# Patient Record
Sex: FEMALE | Race: WHITE | NOT HISPANIC OR LATINO | URBAN - METROPOLITAN AREA
[De-identification: names, ages, dates, MRNs, and addresses within clinical notes are randomized per-mention and may not be internally consistent; named-entity substitution may affect disease eponyms.]

---

## 2024-06-07 ENCOUNTER — LAB (OUTPATIENT)
Dept: LAB | Facility: LAB | Age: 59
End: 2024-06-07
Payer: COMMERCIAL

## 2024-06-07 ENCOUNTER — OFFICE VISIT (OUTPATIENT)
Dept: PRIMARY CARE | Facility: CLINIC | Age: 59
End: 2024-06-07
Payer: COMMERCIAL

## 2024-06-07 VITALS
SYSTOLIC BLOOD PRESSURE: 124 MMHG | HEIGHT: 62 IN | HEART RATE: 56 BPM | OXYGEN SATURATION: 100 % | RESPIRATION RATE: 18 BRPM | TEMPERATURE: 97.2 F | WEIGHT: 144.6 LBS | DIASTOLIC BLOOD PRESSURE: 70 MMHG | BODY MASS INDEX: 26.61 KG/M2

## 2024-06-07 DIAGNOSIS — Z12.11 SCREENING FOR COLON CANCER: ICD-10-CM

## 2024-06-07 DIAGNOSIS — D68.51 FACTOR V LEIDEN (MULTI): ICD-10-CM

## 2024-06-07 DIAGNOSIS — Z00.00 WELLNESS EXAMINATION: ICD-10-CM

## 2024-06-07 DIAGNOSIS — D68.51 FACTOR V LEIDEN (MULTI): Primary | ICD-10-CM

## 2024-06-07 DIAGNOSIS — Z00.00 WELLNESS EXAMINATION: Primary | ICD-10-CM

## 2024-06-07 LAB
ALBUMIN SERPL BCP-MCNC: 4 G/DL (ref 3.4–5)
ALP SERPL-CCNC: 57 U/L (ref 33–110)
ALT SERPL W P-5'-P-CCNC: 16 U/L (ref 7–45)
ANION GAP SERPL CALC-SCNC: 13 MMOL/L (ref 10–20)
APPEARANCE UR: CLEAR
AST SERPL W P-5'-P-CCNC: 18 U/L (ref 9–39)
BASOPHILS # BLD AUTO: 0.08 X10*3/UL (ref 0–0.1)
BASOPHILS NFR BLD AUTO: 1.7 %
BILIRUB SERPL-MCNC: 0.6 MG/DL (ref 0–1.2)
BILIRUB UR STRIP.AUTO-MCNC: NEGATIVE MG/DL
BUN SERPL-MCNC: 20 MG/DL (ref 6–23)
CALCIUM SERPL-MCNC: 9 MG/DL (ref 8.6–10.3)
CHLORIDE SERPL-SCNC: 105 MMOL/L (ref 98–107)
CHOLEST SERPL-MCNC: 282 MG/DL (ref 0–199)
CHOLESTEROL/HDL RATIO: 2.3
CO2 SERPL-SCNC: 28 MMOL/L (ref 21–32)
COLOR UR: ABNORMAL
CREAT SERPL-MCNC: 0.78 MG/DL (ref 0.5–1.05)
EGFRCR SERPLBLD CKD-EPI 2021: 88 ML/MIN/1.73M*2
EOSINOPHIL # BLD AUTO: 0.39 X10*3/UL (ref 0–0.7)
EOSINOPHIL NFR BLD AUTO: 8.4 %
ERYTHROCYTE [DISTWIDTH] IN BLOOD BY AUTOMATED COUNT: 13.2 % (ref 11.5–14.5)
GLUCOSE SERPL-MCNC: 82 MG/DL (ref 74–99)
GLUCOSE UR STRIP.AUTO-MCNC: NORMAL MG/DL
HCT VFR BLD AUTO: 39.2 % (ref 36–46)
HDLC SERPL-MCNC: 122.8 MG/DL
HGB BLD-MCNC: 13.4 G/DL (ref 12–16)
IMM GRANULOCYTES # BLD AUTO: 0.01 X10*3/UL (ref 0–0.7)
IMM GRANULOCYTES NFR BLD AUTO: 0.2 % (ref 0–0.9)
KETONES UR STRIP.AUTO-MCNC: NEGATIVE MG/DL
LDLC SERPL CALC-MCNC: 145 MG/DL
LEUKOCYTE ESTERASE UR QL STRIP.AUTO: ABNORMAL
LYMPHOCYTES # BLD AUTO: 1.77 X10*3/UL (ref 1.2–4.8)
LYMPHOCYTES NFR BLD AUTO: 38.1 %
MCH RBC QN AUTO: 33 PG (ref 26–34)
MCHC RBC AUTO-ENTMCNC: 34.2 G/DL (ref 32–36)
MCV RBC AUTO: 97 FL (ref 80–100)
MONOCYTES # BLD AUTO: 0.36 X10*3/UL (ref 0.1–1)
MONOCYTES NFR BLD AUTO: 7.8 %
MUCOUS THREADS #/AREA URNS AUTO: NORMAL /LPF
NEUTROPHILS # BLD AUTO: 2.03 X10*3/UL (ref 1.2–7.7)
NEUTROPHILS NFR BLD AUTO: 43.8 %
NITRITE UR QL STRIP.AUTO: NEGATIVE
NON HDL CHOLESTEROL: 159 MG/DL (ref 0–149)
NRBC BLD-RTO: 0 /100 WBCS (ref 0–0)
PH UR STRIP.AUTO: 5.5 [PH]
PLATELET # BLD AUTO: 218 X10*3/UL (ref 150–450)
POTASSIUM SERPL-SCNC: 4.6 MMOL/L (ref 3.5–5.3)
PROT SERPL-MCNC: 6.9 G/DL (ref 6.4–8.2)
PROT UR STRIP.AUTO-MCNC: NEGATIVE MG/DL
RBC # BLD AUTO: 4.06 X10*6/UL (ref 4–5.2)
RBC # UR STRIP.AUTO: NEGATIVE /UL
RBC #/AREA URNS AUTO: NORMAL /HPF
SODIUM SERPL-SCNC: 141 MMOL/L (ref 136–145)
SP GR UR STRIP.AUTO: 1.02
SQUAMOUS #/AREA URNS AUTO: NORMAL /HPF
TRIGL SERPL-MCNC: 73 MG/DL (ref 0–149)
TSH SERPL-ACNC: 3.3 MIU/L (ref 0.44–3.98)
UROBILINOGEN UR STRIP.AUTO-MCNC: NORMAL MG/DL
VLDL: 15 MG/DL (ref 0–40)
WBC # BLD AUTO: 4.6 X10*3/UL (ref 4.4–11.3)
WBC #/AREA URNS AUTO: NORMAL /HPF

## 2024-06-07 PROCEDURE — 83036 HEMOGLOBIN GLYCOSYLATED A1C: CPT

## 2024-06-07 PROCEDURE — 99203 OFFICE O/P NEW LOW 30 MIN: CPT | Performed by: INTERNAL MEDICINE

## 2024-06-07 PROCEDURE — 36415 COLL VENOUS BLD VENIPUNCTURE: CPT

## 2024-06-07 PROCEDURE — 80053 COMPREHEN METABOLIC PANEL: CPT

## 2024-06-07 PROCEDURE — 81001 URINALYSIS AUTO W/SCOPE: CPT

## 2024-06-07 PROCEDURE — 80061 LIPID PANEL: CPT

## 2024-06-07 PROCEDURE — 99386 PREV VISIT NEW AGE 40-64: CPT | Performed by: INTERNAL MEDICINE

## 2024-06-07 PROCEDURE — 1036F TOBACCO NON-USER: CPT | Performed by: INTERNAL MEDICINE

## 2024-06-07 PROCEDURE — 84443 ASSAY THYROID STIM HORMONE: CPT

## 2024-06-07 PROCEDURE — 85025 COMPLETE CBC W/AUTO DIFF WBC: CPT

## 2024-06-07 PROCEDURE — 81241 F5 GENE: CPT

## 2024-06-07 ASSESSMENT — ENCOUNTER SYMPTOMS
DYSURIA: 0
ALLERGIC/IMMUNOLOGIC NEGATIVE: 1
FREQUENCY: 0
WEAKNESS: 0
DIZZINESS: 0
ABDOMINAL PAIN: 0
HEADACHES: 0
BACK PAIN: 0
BLOOD IN STOOL: 0
FEVER: 0
CONSTIPATION: 0
FATIGUE: 0
PALPITATIONS: 0
ADENOPATHY: 0
ARTHRALGIAS: 0
NUMBNESS: 0
SHORTNESS OF BREATH: 0
WHEEZING: 0
ACTIVITY CHANGE: 0
JOINT SWELLING: 0
ENDOCRINE NEGATIVE: 1
COUGH: 0
AGITATION: 0
EYE REDNESS: 0

## 2024-06-07 NOTE — PROGRESS NOTES
"Subjective   Patient ID: Ada Lott is a 58 y.o. female who presents for Establish Care.    She is here to get established. She works at [employer].    PMH : Factor V Leyden def , DVT right leg , 2018    PSH : right patella repair    Allergy : NKA    FH :  Father -  due to aortic aneurysm rupture, at 59  Mother - no issues , senility  Sister -  due to myeloid leukemia  Cousin - Factor V leyden def    PH :  never a smoker , alcohol - socially, No drug use  Works at [employer] , plays golf , and exercises               Review of Systems   Constitutional:  Negative for activity change, fatigue and fever.   HENT:  Negative for congestion.    Eyes:  Negative for redness and visual disturbance.   Respiratory:  Negative for cough, shortness of breath and wheezing.    Cardiovascular:  Negative for chest pain, palpitations and leg swelling.   Gastrointestinal:  Negative for abdominal pain, blood in stool and constipation.   Endocrine: Negative.    Genitourinary:  Negative for dysuria, frequency and urgency.   Musculoskeletal:  Negative for arthralgias, back pain and joint swelling.   Skin:  Negative for rash.   Allergic/Immunologic: Negative.    Neurological:  Negative for dizziness, weakness, numbness and headaches.   Hematological:  Negative for adenopathy.   Psychiatric/Behavioral:  Negative for agitation and behavioral problems.        Objective   /70 (BP Location: Left arm, Patient Position: Sitting, BP Cuff Size: Adult)   Pulse 56   Temp 36.2 °C (97.2 °F) (Temporal)   Resp 18   Ht 1.575 m (5' 2\")   Wt 65.6 kg (144 lb 9.6 oz)   SpO2 100%   BMI 26.45 kg/m²     Physical Exam  Constitutional:       Appearance: Normal appearance.   HENT:      Head: Normocephalic and atraumatic.      Right Ear: Tympanic membrane and external ear normal.      Left Ear: Tympanic membrane and external ear normal.      Nose: No congestion.      Mouth/Throat:      Mouth: Mucous membranes are moist.      " Pharynx: Oropharynx is clear.   Eyes:      Extraocular Movements: Extraocular movements intact.      Conjunctiva/sclera: Conjunctivae normal.      Pupils: Pupils are equal, round, and reactive to light.   Cardiovascular:      Rate and Rhythm: Normal rate and regular rhythm.      Pulses: Normal pulses.      Heart sounds: Normal heart sounds. No murmur heard.  Pulmonary:      Effort: Pulmonary effort is normal.      Breath sounds: Normal breath sounds. No wheezing or rales.   Abdominal:      General: Abdomen is flat. Bowel sounds are normal.      Palpations: Abdomen is soft.      Hernia: No hernia is present.   Musculoskeletal:         General: No swelling or tenderness. Normal range of motion.      Cervical back: Normal range of motion and neck supple.      Right lower leg: No edema.      Left lower leg: No edema.   Skin:     General: Skin is warm and dry.      Capillary Refill: Capillary refill takes less than 2 seconds.      Findings: No rash.   Neurological:      General: No focal deficit present.      Mental Status: She is alert and oriented to person, place, and time.   Psychiatric:         Mood and Affect: Mood normal.         Behavior: Behavior normal.         Assessment/Plan        H/o right leg DVT :   Eliquis po bid    Factor V Leyden :   High risk for clotting, DVT  Refer to Dr. Antoinette Doan [facility]    Vision check  Flushot in Fall  Covid booster , Shingrix at pharmacy  Check labs  Gyn check , and pelvic / pap - Dr Betina Norton  Colonoscopy - refer to Dr Herrera [facility]

## 2024-06-08 LAB
EST. AVERAGE GLUCOSE BLD GHB EST-MCNC: 128 MG/DL
HBA1C MFR BLD: 6.1 %

## 2024-06-09 DIAGNOSIS — E78.00 PURE HYPERCHOLESTEROLEMIA: Primary | ICD-10-CM

## 2024-06-09 RX ORDER — ATORVASTATIN CALCIUM 20 MG/1
20 TABLET, FILM COATED ORAL DAILY
Qty: 100 TABLET | Refills: 3 | Status: SHIPPED | OUTPATIENT
Start: 2024-06-09 | End: 2025-07-14

## 2024-06-09 NOTE — RESULT ENCOUNTER NOTE
High lipids, and A1C is 6.1  Atorvastatin 20 mf daily, eRx sent  Low saturated fat diet  Watch diet for carbs and sweets

## 2024-06-10 NOTE — TELEPHONE ENCOUNTER
Ok, we can repeat lipid panel in six months. Continue low fat diet   Hematology consult , Dr Antoinette Doan at [facility]

## 2024-06-10 NOTE — TELEPHONE ENCOUNTER
----- Message from Pamela Allen MD sent at 6/9/2024 12:24 PM EDT -----  High lipids, and A1C is 6.1  Atorvastatin 20 mf daily, eRx sent  Low saturated fat diet  Watch diet for carbs and sweets

## 2024-06-14 LAB
ELECTRONICALLY SIGNED BY: ABNORMAL
FACTOR V LEIDEN INTERPRETATION: ABNORMAL
FACTOR V LEIDEN RESULT: ABNORMAL

## 2024-06-17 NOTE — RESULT ENCOUNTER NOTE
Factor V Leiden is heterozygous  She should see Hematology, Dr Antoinette Doan at [facility], consult was ordered

## 2024-07-22 ENCOUNTER — TELEPHONE (OUTPATIENT)
Dept: PRIMARY CARE | Facility: CLINIC | Age: 59
End: 2024-07-22
Payer: COMMERCIAL

## 2024-07-22 NOTE — TELEPHONE ENCOUNTER
Your statin is quite high, and if you do not take statin,, your CV risk will remain high , FYI  Low saturated fat diet , and exercise , and may repeat lipid panel in 4 to six months

## 2025-01-14 DIAGNOSIS — D68.51 FACTOR V LEIDEN (MULTI): ICD-10-CM

## 2025-01-14 NOTE — TELEPHONE ENCOUNTER
Last seen on 6/07/24, Pl. Schedule follow up OV  She should have seen Hematology , Dr Antoinette Doan , was  referred  Med refilled

## 2025-05-15 DIAGNOSIS — Z00.00 WELLNESS EXAMINATION: Primary | ICD-10-CM

## 2025-05-15 DIAGNOSIS — R73.9 HYPERGLYCEMIA: ICD-10-CM

## 2025-05-15 DIAGNOSIS — E78.00 PURE HYPERCHOLESTEROLEMIA: ICD-10-CM

## 2025-05-16 ENCOUNTER — OFFICE VISIT (OUTPATIENT)
Dept: PRIMARY CARE | Facility: CLINIC | Age: 60
End: 2025-05-16
Payer: COMMERCIAL

## 2025-05-16 VITALS
HEART RATE: 70 BPM | SYSTOLIC BLOOD PRESSURE: 118 MMHG | BODY MASS INDEX: 27.57 KG/M2 | TEMPERATURE: 96.3 F | WEIGHT: 149.8 LBS | OXYGEN SATURATION: 99 % | HEIGHT: 62 IN | RESPIRATION RATE: 18 BRPM | DIASTOLIC BLOOD PRESSURE: 68 MMHG

## 2025-05-16 DIAGNOSIS — Z12.11 SCREENING FOR COLON CANCER: ICD-10-CM

## 2025-05-16 DIAGNOSIS — D68.51 FACTOR V LEIDEN (MULTI): ICD-10-CM

## 2025-05-16 DIAGNOSIS — Z00.00 WELLNESS EXAMINATION: Primary | ICD-10-CM

## 2025-05-16 DIAGNOSIS — E78.00 PURE HYPERCHOLESTEROLEMIA: ICD-10-CM

## 2025-05-16 DIAGNOSIS — Z86.718 HISTORY OF DVT (DEEP VEIN THROMBOSIS): ICD-10-CM

## 2025-05-16 PROCEDURE — 3008F BODY MASS INDEX DOCD: CPT | Performed by: INTERNAL MEDICINE

## 2025-05-16 PROCEDURE — 99214 OFFICE O/P EST MOD 30 MIN: CPT | Performed by: INTERNAL MEDICINE

## 2025-05-16 PROCEDURE — 99396 PREV VISIT EST AGE 40-64: CPT | Performed by: INTERNAL MEDICINE

## 2025-05-16 PROCEDURE — 1036F TOBACCO NON-USER: CPT | Performed by: INTERNAL MEDICINE

## 2025-05-16 RX ORDER — ATORVASTATIN CALCIUM 20 MG/1
20 TABLET, FILM COATED ORAL DAILY
Qty: 100 TABLET | Refills: 3 | Status: SHIPPED | OUTPATIENT
Start: 2025-05-16 | End: 2026-06-20

## 2025-05-16 ASSESSMENT — ENCOUNTER SYMPTOMS
ACTIVITY CHANGE: 0
ENDOCRINE NEGATIVE: 1
HEADACHES: 0
PALPITATIONS: 0
BACK PAIN: 0
DIZZINESS: 0
ABDOMINAL DISTENTION: 0
SHORTNESS OF BREATH: 0
NERVOUS/ANXIOUS: 0
WHEEZING: 0
ALLERGIC/IMMUNOLOGIC NEGATIVE: 1
DYSURIA: 0
CONSTIPATION: 0
EYE REDNESS: 0
FREQUENCY: 0
ADENOPATHY: 0
AGITATION: 0
BLOOD IN STOOL: 0

## 2025-05-16 ASSESSMENT — PATIENT HEALTH QUESTIONNAIRE - PHQ9
1. LITTLE INTEREST OR PLEASURE IN DOING THINGS: NOT AT ALL
2. FEELING DOWN, DEPRESSED OR HOPELESS: NOT AT ALL
SUM OF ALL RESPONSES TO PHQ9 QUESTIONS 1 AND 2: 0

## 2025-05-16 NOTE — PROGRESS NOTES
"Subjective   Patient ID: Ada Lott is a 59 y.o. female who presents for Annual Exam and Med Refill.    She is here  for annual wellness, and check labs.     PMH : Factor V Leyden def , DVT left leg , 2018    PSH : right patella repair    Allergy : NKA    FH :  Father -  due to aortic aneurysm rupture, at 59  Mother - no issues , senility  Sister -  due to myeloid leukemia  Cousin - Factor V leyden def    PH :  never a smoker , alcohol - socially, No drug use  Worked at [employer] , she plays golf , and exercises                Review of Systems   Constitutional:  Negative for activity change.   HENT:  Negative for congestion.    Eyes:  Negative for redness and visual disturbance.   Respiratory:  Negative for shortness of breath and wheezing.    Cardiovascular:  Negative for chest pain, palpitations and leg swelling.   Gastrointestinal:  Negative for abdominal distention, blood in stool and constipation.   Endocrine: Negative.    Genitourinary:  Negative for dysuria, frequency and urgency.   Musculoskeletal:  Negative for back pain.   Allergic/Immunologic: Negative.    Neurological:  Negative for dizziness and headaches.   Hematological:  Negative for adenopathy.   Psychiatric/Behavioral:  Negative for agitation and behavioral problems. The patient is not nervous/anxious.        Objective   /68 (BP Location: Left arm, Patient Position: Sitting, BP Cuff Size: Adult)   Pulse 70   Temp 35.7 °C (96.3 °F) (Temporal)   Resp 18   Ht 1.575 m (5' 2\")   Wt 67.9 kg (149 lb 12.8 oz)   SpO2 99%   BMI 27.40 kg/m²     Physical Exam  Constitutional:       Appearance: Normal appearance.   HENT:      Head: Normocephalic and atraumatic.      Right Ear: Tympanic membrane and external ear normal.      Left Ear: Tympanic membrane and external ear normal.      Nose: No congestion.      Mouth/Throat:      Mouth: Mucous membranes are moist.      Pharynx: Oropharynx is clear.   Eyes:      Extraocular " Movements: Extraocular movements intact.      Conjunctiva/sclera: Conjunctivae normal.      Pupils: Pupils are equal, round, and reactive to light.   Neck:      Vascular: No carotid bruit.   Cardiovascular:      Rate and Rhythm: Normal rate and regular rhythm.      Pulses: Normal pulses.      Heart sounds: Normal heart sounds. No murmur heard.  Pulmonary:      Effort: Pulmonary effort is normal.      Breath sounds: Normal breath sounds. No wheezing or rales.   Abdominal:      General: Abdomen is flat. Bowel sounds are normal.      Palpations: Abdomen is soft.      Hernia: No hernia is present.   Musculoskeletal:         General: No swelling or tenderness. Normal range of motion.      Cervical back: Normal range of motion and neck supple.      Right lower leg: No edema.      Left lower leg: No edema.   Skin:     General: Skin is warm and dry.      Capillary Refill: Capillary refill takes less than 2 seconds.      Findings: No rash.   Neurological:      General: No focal deficit present.      Mental Status: She is alert and oriented to person, place, and time.      Motor: No weakness.      Gait: Gait normal.   Psychiatric:         Mood and Affect: Mood normal.         Behavior: Behavior normal.         Assessment/Plan        H/o  left leg DVT : 12/2017  Eliquis 2.5 mg  po bid    Factor V Leyden mutation, Heterozygous.    High risk for clotting, DVT  She has seen Dr Cosme Sanz before, Hematology  She wants to see Hematology in  [place]      HPL :   Statin  Low saturated fat diet    Hyperglycemia :  Reduce carbs , sweets  Follow A1C    Vision check  Flushot in Fall  Covid booster , Shingrix at pharmacy, Prevnar 20  Check labs  Gyn check , and pelvic / pap - Dr Betina Norton  Colonoscopy - refer to [facility] Encarnacion ( she seems reluctant )  Cologuard

## 2025-05-22 LAB
ALBUMIN SERPL-MCNC: 4.2 G/DL (ref 3.6–5.1)
ALP SERPL-CCNC: 58 U/L (ref 37–153)
ALT SERPL-CCNC: 16 U/L (ref 6–29)
ANION GAP SERPL CALCULATED.4IONS-SCNC: 7 MMOL/L (CALC) (ref 7–17)
APPEARANCE UR: CLEAR
AST SERPL-CCNC: 21 U/L (ref 10–35)
BACTERIA #/AREA URNS HPF: ABNORMAL /HPF
BASOPHILS # BLD AUTO: 80 CELLS/UL (ref 0–200)
BASOPHILS NFR BLD AUTO: 1.7 %
BILIRUB SERPL-MCNC: 0.6 MG/DL (ref 0.2–1.2)
BILIRUB UR QL STRIP: NEGATIVE
BUN SERPL-MCNC: 14 MG/DL (ref 7–25)
CALCIUM SERPL-MCNC: 9.5 MG/DL (ref 8.6–10.4)
CHLORIDE SERPL-SCNC: 103 MMOL/L (ref 98–110)
CHOLEST SERPL-MCNC: 292 MG/DL
CHOLEST/HDLC SERPL: 2.5 (CALC)
CO2 SERPL-SCNC: 28 MMOL/L (ref 20–32)
COLOR UR: YELLOW
CREAT SERPL-MCNC: 0.8 MG/DL (ref 0.5–1.05)
EGFRCR SERPLBLD CKD-EPI 2021: 84 ML/MIN/1.73M2
EOSINOPHIL # BLD AUTO: 277 CELLS/UL (ref 15–500)
EOSINOPHIL NFR BLD AUTO: 5.9 %
ERYTHROCYTE [DISTWIDTH] IN BLOOD BY AUTOMATED COUNT: 12.1 % (ref 11–15)
EST. AVERAGE GLUCOSE BLD GHB EST-MCNC: 123 MG/DL
EST. AVERAGE GLUCOSE BLD GHB EST-SCNC: 6.8 MMOL/L
GLUCOSE SERPL-MCNC: 92 MG/DL (ref 65–99)
GLUCOSE UR QL STRIP: NEGATIVE
HBA1C MFR BLD: 5.9 %
HCT VFR BLD AUTO: 39.9 % (ref 35–45)
HDLC SERPL-MCNC: 116 MG/DL
HGB BLD-MCNC: 13.1 G/DL (ref 11.7–15.5)
HGB UR QL STRIP: NEGATIVE
HYALINE CASTS #/AREA URNS LPF: ABNORMAL /LPF
KETONES UR QL STRIP: ABNORMAL
LDLC SERPL CALC-MCNC: 160 MG/DL (CALC)
LEUKOCYTE ESTERASE UR QL STRIP: ABNORMAL
LYMPHOCYTES # BLD AUTO: 2176 CELLS/UL (ref 850–3900)
LYMPHOCYTES NFR BLD AUTO: 46.3 %
MCH RBC QN AUTO: 31.9 PG (ref 27–33)
MCHC RBC AUTO-ENTMCNC: 32.8 G/DL (ref 32–36)
MCV RBC AUTO: 97.1 FL (ref 80–100)
MONOCYTES # BLD AUTO: 310 CELLS/UL (ref 200–950)
MONOCYTES NFR BLD AUTO: 6.6 %
NEUTROPHILS # BLD AUTO: 1857 CELLS/UL (ref 1500–7800)
NEUTROPHILS NFR BLD AUTO: 39.5 %
NITRITE UR QL STRIP: NEGATIVE
NONHDLC SERPL-MCNC: 176 MG/DL (CALC)
PH UR STRIP: 6.5 [PH] (ref 5–8)
PLATELET # BLD AUTO: 309 THOUSAND/UL (ref 140–400)
PMV BLD REES-ECKER: 11.1 FL (ref 7.5–12.5)
POTASSIUM SERPL-SCNC: 4.3 MMOL/L (ref 3.5–5.3)
PROT SERPL-MCNC: 7.1 G/DL (ref 6.1–8.1)
PROT UR QL STRIP: NEGATIVE
RBC # BLD AUTO: 4.11 MILLION/UL (ref 3.8–5.1)
RBC #/AREA URNS HPF: ABNORMAL /HPF
SERVICE CMNT-IMP: ABNORMAL
SODIUM SERPL-SCNC: 138 MMOL/L (ref 135–146)
SP GR UR STRIP: 1.02 (ref 1–1.03)
SQUAMOUS #/AREA URNS HPF: ABNORMAL /HPF
TRIGL SERPL-MCNC: 69 MG/DL
WBC # BLD AUTO: 4.7 THOUSAND/UL (ref 3.8–10.8)
WBC #/AREA URNS HPF: ABNORMAL /HPF

## 2025-05-23 DIAGNOSIS — E78.00 PURE HYPERCHOLESTEROLEMIA: ICD-10-CM

## 2025-05-23 RX ORDER — ATORVASTATIN CALCIUM 80 MG/1
80 TABLET, FILM COATED ORAL DAILY
Qty: 90 TABLET | Refills: 3 | Status: SHIPPED | OUTPATIENT
Start: 2025-05-23

## 2025-05-23 NOTE — RESULT ENCOUNTER NOTE
are too high lipids  Other labs are ok  Increase atorvastatin to 80 mg daily, strict low fat diet   Script sent

## 2025-06-02 ENCOUNTER — HOSPITAL ENCOUNTER (OUTPATIENT)
Dept: RADIOLOGY | Facility: CLINIC | Age: 60
Discharge: HOME | End: 2025-06-02
Payer: COMMERCIAL

## 2025-06-02 ENCOUNTER — OFFICE VISIT (OUTPATIENT)
Dept: ORTHOPEDIC SURGERY | Facility: CLINIC | Age: 60
End: 2025-06-02
Payer: COMMERCIAL

## 2025-06-02 DIAGNOSIS — M25.551 PAIN OF RIGHT HIP: ICD-10-CM

## 2025-06-02 DIAGNOSIS — M25.561 ACUTE PAIN OF RIGHT KNEE: ICD-10-CM

## 2025-06-02 DIAGNOSIS — M25.561 ACUTE PAIN OF RIGHT KNEE: Primary | ICD-10-CM

## 2025-06-02 PROCEDURE — 73560 X-RAY EXAM OF KNEE 1 OR 2: CPT | Mod: RIGHT SIDE | Performed by: RADIOLOGY

## 2025-06-02 PROCEDURE — 73560 X-RAY EXAM OF KNEE 1 OR 2: CPT | Mod: RT

## 2025-06-02 PROCEDURE — 73502 X-RAY EXAM HIP UNI 2-3 VIEWS: CPT | Mod: RT

## 2025-06-02 PROCEDURE — 99214 OFFICE O/P EST MOD 30 MIN: CPT

## 2025-06-03 ENCOUNTER — TELEPHONE (OUTPATIENT)
Dept: PRIMARY CARE | Facility: CLINIC | Age: 60
End: 2025-06-03
Payer: COMMERCIAL

## 2025-06-03 LAB — NONINV COLON CA DNA+OCC BLD SCRN STL QL: NEGATIVE

## 2025-06-03 NOTE — TELEPHONE ENCOUNTER
----- Message from Pamela Allen sent at 6/2/2025  3:53 PM EDT -----  Degenerative changes in hip joints and spine  ----- Message -----  From: Interface, Radiology Results In  Sent: 6/2/2025   3:22 PM EDT  To: Pamela Allen MD

## 2025-06-05 NOTE — PROGRESS NOTES
History of Present Illness  Ada Lott is a 59 y.o.s female presenting for evaluation of side: right hip and knee pain for the past 1 month. Sx started out of nowhere and localized to right lateral pain that shoots down from the hip to the knee. Described as sharp, shooting pain. It does not go into the toes. Since increased with golfing. Improved with tylenol and rest. has not had similar sx in the past. has not tried therapy for the pain.  Denies trauma/fall. Denies numbness, tingling, f/c, n/v, CP, SOB, or any other complaints/concerns.      Medical History[1]    Medication Documentation Review Audit       Reviewed by Yeni Hidalgo LPN (Licensed Nurse) on 06/02/25 at 0846      Medication Order Taking? Sig Documenting Provider Last Dose Status   apixaban (Eliquis) 2.5 mg tablet [id number] Yes Take 1 tablet (2.5 mg) by mouth 2 times a day. Pamela Allen MD  Active   atorvastatin (Lipitor) 80 mg tablet [id number] Yes Take 1 tablet (80 mg) by mouth once daily. Pamela Allen MD  Active                    RX Allergies[2]    Social History     Socioeconomic History    Marital status:      Spouse name: Not on file    Number of children: Not on file    Years of education: Not on file    Highest education level: Not on file   Occupational History    Not on file   Tobacco Use    Smoking status: Never    Smokeless tobacco: Never   Substance and Sexual Activity    Alcohol use: Yes    Drug use: Never    Sexual activity: Not on file   Other Topics Concern    Not on file   Social History Narrative    Not on file     Social Drivers of Health     Financial Resource Strain: Not on file   Food Insecurity: Not on file   Transportation Needs: Not on file   Physical Activity: Not on file   Stress: Not on file   Social Connections: Not on file   Intimate Partner Violence: Not on file   Housing Stability: Not on file       Surgical History[3]     Review of Systems   30 point ROS reviewed and negative other than as  listed in the HPI.      Exam  Gen: The pt is A&Ox3, NAD, and appear state age and weight  Psychiatric: mood and affect are appropriate   Eyes: sclera are white, EOM grossly intact  ENT: MMM  Neck: supple, thyroid is midline  Respiratory: respirations are nonlabored, chest rise symmetric  CV: rate is regular by palpation of distal pulses  Abdomen: nondistended   Integument: no obvious cutaneous lesions noted. No signs of lymphangitis. No signs of systemic edema.   MSK:  Gait and stance examination demonstrate a reciprocal heel toe gait. Trendelenburg sign is negative.    right hip examination reveals 120 degrees of flexion, 40 degrees of internal rotation, 60 degrees of external rotation, and 40 degrees of abduction. Anterior impingement sign is negative. Posterior impingement sign is negative. Subspine impingement sign is negative. ELZA test is negative. There is no tenderness to palpation over the anterior groin. There is tenderness to palpation over the pubic symphysis, greater trochanter, or gluteal region. Posterior apprehension is negative. Anterior apprehension is negative. Hip flexion strength is 5 out of 5. Adductor strength is 5 out of 5. Abduction strength is 5 out of 5 in the lateral position. Pelvic obliquity is level.    Distally, ankle dorsiflexion and plantarflexion as well as great toe extension is 5 out of 5. Sensation is intact to light touch in the tibial, sural, saphenous, superficial peroneal, and deep peroneal nerve distributions. The foot is warm and well-perfused with palpable pedal pulses. There is no obvious edema present. Skin is warm, dry and intact.     There is no tenderness at the right knee and has full ROM of the knee.     Imaging:  I personally reviewed multiple views of the right hip, right knee were obtained in the office today demonstrate right hip osteoarthritis and lower lumbar osteoarthritis and right knee patellofemoral arthritis with stable patella hardware.       Assessment:  right hip, knee, and lower lumbar arthritis    Plan:  Patient presents with right hip and knee pain for 1 month. Patient states that the pain is sharp and shooting and does not go down into the toes. She has tried tylenol with some improvement. On exam, there was tenderness along the lateral thigh. Imaging reveals osteoarthritis of the right hip, knee, and lower lumbar. I discussed that this pain could be due to her hip arthritis or it could be due to the nerve being pinched at her lower back arthritis. I discussed treating this conservatively with oral anti-inflammatories, PT, and ultrasound guided hip injection. Patient at this point would just like to try tylenol and PT. A referral was given for PT to work on hip, knee, and lower back ROM, quad strengthening, gait training, and weight bearing. I also discussed that if it does not improve we can start her on gabapentin to see if it helps and can confirm it is nerve pain.     Follow up in 3 months    Natural history reviewed. All of the pt questions/concerns addressed and they are in agreement with the plan.         [1]   Past Medical History:  Diagnosis Date    Abdominal distension (gaseous) 07/10/2018    Abdominal bloating    Acute embolism and thrombosis of unspecified deep veins of unspecified lower extremity 07/10/2018    DVT (deep venous thrombosis)    Decreased white blood cell count, unspecified 07/12/2018    Leukopenia    Encounter for screening for malignant neoplasm of cervix 09/27/2018    Pap smear for cervical cancer screening    Encounter for screening for malignant neoplasm of colon 07/10/2018    Screen for colon cancer    Encounter for screening mammogram for malignant neoplasm of breast 07/10/2018    Screening mammogram, encounter for    Pain in right leg 02/03/2021    Right leg pain   [2]   Allergies  Allergen Reactions    Tramadol Other   [3] No past surgical history on file.

## 2025-06-10 ENCOUNTER — EVALUATION (OUTPATIENT)
Dept: PHYSICAL THERAPY | Facility: CLINIC | Age: 60
End: 2025-06-10
Payer: COMMERCIAL

## 2025-06-10 DIAGNOSIS — M16.11 ARTHRITIS OF RIGHT HIP: Primary | ICD-10-CM

## 2025-06-10 PROCEDURE — 97161 PT EVAL LOW COMPLEX 20 MIN: CPT | Mod: GP | Performed by: PHYSICAL THERAPIST

## 2025-06-10 PROCEDURE — 97110 THERAPEUTIC EXERCISES: CPT | Mod: GP | Performed by: PHYSICAL THERAPIST

## 2025-06-10 ASSESSMENT — PATIENT HEALTH QUESTIONNAIRE - PHQ9
SUM OF ALL RESPONSES TO PHQ9 QUESTIONS 1 AND 2: 0
2. FEELING DOWN, DEPRESSED OR HOPELESS: NOT AT ALL
1. LITTLE INTEREST OR PLEASURE IN DOING THINGS: NOT AT ALL

## 2025-06-10 ASSESSMENT — ENCOUNTER SYMPTOMS
LOSS OF SENSATION IN FEET: 0
OCCASIONAL FEELINGS OF UNSTEADINESS: 0
DEPRESSION: 0

## 2025-06-10 NOTE — PROGRESS NOTES
Physical Therapy Evaluation    Patient Name: Ada Lott  MRN: [id number]  Today's Date: 6/10/2025  Visit: 1/auth  DOS/DOI:  2/1/25  End date:  per auth  Referred by:   Mirta Samuels  Time Calculation  Start Time: 1224  Stop Time: 1303  Time Calculation (min): 39 min  PT Evaluation Time Entry  PT Evaluation (Low) Time Entry: 27  PT Therapeutic Procedures Time Entry  Therapeutic Exercise Time Entry: 12                 Diagnosis:   1. Arthritis of right hip  Follow Up In Physical Therapy                PMH  - she has factor 5 blood clotting disorder.  - hx of lumbar disc bulge in 2018    HPI  She dev anterior R hip pain a few months ago without injury.   She tried doing her own stretches.  It continued to bother her and she saw her MD.  X-rays were done which were positive for mild to mod OA.  She was referred to PT.    Precautions  - facto 5 blood clotting disorder.    SUBJECTIVE  Symptoms:  - she report lateral and anterior R hip pain rated 2-7/10.  It is described as burning that becomes sharp.  It increases when sitting reclines, stepping up with the R LE and twisting.  It decreases with ice and laying supine.  Functionally she is limited in golfing, Walking, stairs.    Patient's Living Environment:  - her mom lives with her and she cares for her.  Her partner lives next door  - 1 floor home ([facility]).  1 step out of house.    Previous Level of Function:  - she was able to do ADLs prior to 2/1/25    Patient's Therapy Goals:  - resolve pain    OBJECTIVE  Observation:    - amb with normal gait    Palpation:   - no pain at R great troch    AROM:  - Lumbar flex = Wnl, repeated flex = WFL, ext = WFL, SB = 50% B, rot = WFL B with R groin pain during R rot.  - R hip flex = 110, abd = 15 with tightness, ER = 12 with pain, IR = 35 with pain    Myotomes:  - L2-S2 = 5/5 B    MMT:  - R hip flex = 4+/5, ext = 5/5, add- 5/5, abd = 4+/5, ER/IR = 4/5    Special Tests:  - Negative slump, B SLR, compression over load  -  Positive R hip scour, FADDIR and ELZA  - Negative R hip janice    Joint Mobility:   - decreased femoral mobility into add and IR    Outcome Measure:  - LEFS = 65    ASSESSMENT  The patient is a 60 y/o female presenting to PT with R hip pain due to mild to mod OA.  Will also continue to monitor for labral issues.  She has decreased AROM and weakness into IR/ER and has positive special tests for OA and labral involvement.  As a result she has difficulty with stairs, walking and playing golf.  She will benefit from PT to address these deficits for return to ADLS.    Is skilled care required:  yes  Rehab potential:  good  Clinical presentation:  Stable and/or uncomplicated characteristics  Complexity:   . Low complexity due to patient's clinical presentation being stable and uncomplicated by any significant comorbidities that may affect rehab tolerance and progression.       Personal factors effecting care:  none    PLAN  Frequency/duration:  2x/wk x 6 wks  Planned Interventions:  MT, therapeutic exercise, neuro re-ed, modalities prn  Patient/caregiver agrees with POC:  yes    TODAY'S TREATMENT  - evaluation of the R hip completed.  She was educated on her dx and the anatomy of the hip.  - she was given a HEP as seen below:    Access Code: [email or web address]  URL: [email or web address]  Date: 06/10/2025  Prepared by: Deanna Major    Exercises  - Prone Hip Internal and External Rotation AROM  - 1 x daily - 7 x weekly - 2-3 sets - 10 reps  - Prone Hip Extension  - 1 x daily - 7 x weekly - 2-3 sets - 10 reps  - Hip Flexor Stretch at Edge of Bed  - 1 x daily - 7 x weekly - 3 sets - 30 min hold  - Sidelying Hip Abduction  - 1 x daily - 7 x weekly - 2-3 sets - 10 reps    Goals:   Active       PT Problem       PT Goal 1:  she will be independent with her HEP       Start:  06/10/25    Expected End:  06/17/25            PT Goal 2:  increase hip IR/ER AROM by 10-15 degrees       Start:  06/10/25    Expected End:   25            PT Goal 3:  increase strength of IR/ER mm by half grade for stair amb       Start:  06/10/25    Expected End:  25            PT Goal 4:  amb unlimited distances without increase R hip pain for community amb       Start:  06/10/25    Expected End:  25            PT Goal 5:  return to playing golf without increase R hip pain       Start:  06/10/25    Expected End:  25              Insurance Authorization Information  Date of Evaluation: 6/10/25    Onset Date: 2025    Referring Physician: Mirta Samson     Surgery in the Last 3 months:  no    CPT Codes: Therapeutic Exercise: 07770 Therapeutic Activity: 63569 Neuromuscular Re-Education: 35745 Manual Therapy: 24064 Gait Trainin Self-Care/Home Management Trainin Mechanical Traction: 96445 Electric Stimulation (Attended): 78687 Electric Stimulation (Unattended): 27821 Vasopneumatic Device: 10800 PT Re-Evaluation: 23885     Diagnosis:   Problem List Items Addressed This Visit           ICD-10-CM    Arthritis of right hip - Primary M16.11    Relevant Orders    Follow Up In Physical Therapy          Functional Outcome:  LEFS = 65    OT / PT Evaluation complexity:  low    Which of the following best describes the primary reason of therapy: Improving, restoring, or adapting functional mobility or skills    Visits Requested: 12    Date Range: 90 days    Select all conditions that apply: None of these apply

## 2025-06-19 DIAGNOSIS — D68.51 FACTOR V LEIDEN (MULTI): Primary | ICD-10-CM

## 2025-06-19 DIAGNOSIS — Z86.718 HISTORY OF DVT (DEEP VEIN THROMBOSIS): ICD-10-CM

## 2025-06-25 DIAGNOSIS — Z12.31 ENCOUNTER FOR SCREENING MAMMOGRAM FOR BREAST CANCER: ICD-10-CM

## 2025-08-14 ENCOUNTER — DOCUMENTATION (OUTPATIENT)
Dept: PHYSICAL THERAPY | Facility: CLINIC | Age: 60
End: 2025-08-14
Payer: COMMERCIAL